# Patient Record
Sex: MALE | Race: ASIAN | NOT HISPANIC OR LATINO | URBAN - METROPOLITAN AREA
[De-identification: names, ages, dates, MRNs, and addresses within clinical notes are randomized per-mention and may not be internally consistent; named-entity substitution may affect disease eponyms.]

---

## 2023-11-05 ENCOUNTER — EMERGENCY (EMERGENCY)
Facility: HOSPITAL | Age: 48
LOS: 1 days | Discharge: HOME | End: 2023-11-05
Attending: EMERGENCY MEDICINE | Admitting: EMERGENCY MEDICINE
Payer: COMMERCIAL

## 2023-11-05 VITALS
HEIGHT: 70 IN | TEMPERATURE: 98 F | OXYGEN SATURATION: 99 % | DIASTOLIC BLOOD PRESSURE: 80 MMHG | WEIGHT: 160.06 LBS | HEART RATE: 65 BPM | SYSTOLIC BLOOD PRESSURE: 145 MMHG | RESPIRATION RATE: 16 BRPM

## 2023-11-05 PROCEDURE — 12002 RPR S/N/AX/GEN/TRNK2.6-7.5CM: CPT

## 2023-11-05 PROCEDURE — 99283 EMERGENCY DEPT VISIT LOW MDM: CPT

## 2023-11-05 PROCEDURE — 90715 TDAP VACCINE 7 YRS/> IM: CPT

## 2023-11-05 PROCEDURE — 99284 EMERGENCY DEPT VISIT MOD MDM: CPT | Mod: 25

## 2023-11-05 RX ORDER — TETANUS TOXOID, REDUCED DIPHTHERIA TOXOID AND ACELLULAR PERTUSSIS VACCINE, ADSORBED 5; 2.5; 8; 8; 2.5 [IU]/.5ML; [IU]/.5ML; UG/.5ML; UG/.5ML; UG/.5ML
0.5 SUSPENSION INTRAMUSCULAR ONCE
Refills: 0 | Status: COMPLETED | OUTPATIENT
Start: 2023-11-05 | End: 2023-11-05

## 2023-11-05 RX ADMIN — TETANUS TOXOID, REDUCED DIPHTHERIA TOXOID AND ACELLULAR PERTUSSIS VACCINE, ADSORBED 0.5 MILLILITER(S): 5; 2.5; 8; 8; 2.5 SUSPENSION INTRAMUSCULAR at 15:37

## 2023-11-05 NOTE — ED ADULT NURSE NOTE - NSFALLUNIVINTERV_ED_ALL_ED
Bed/Stretcher in lowest position, wheels locked, appropriate side rails in place/Call bell, personal items and telephone in reach/Instruct patient to call for assistance before getting out of bed/chair/stretcher/Non-slip footwear applied when patient is off stretcher/[place] to call system/Physically safe environment - no spills, clutter or unnecessary equipment/Purposeful proactive rounding/Room/bathroom lighting operational, light cord in reach

## 2023-11-05 NOTE — ED ADULT NURSE NOTE - OBJECTIVE STATEMENT
Pt came in for a laceration wound on his posterior right ankle. Pt reported a bench fell and scraped his ankle . Motor and sensory intact

## 2023-11-05 NOTE — ED ADULT NURSE NOTE - SUICIDE SCREENING QUESTION 1
Problem: PAIN - ADULT  Goal: Verbalizes/displays adequate comfort level or baseline comfort level  Description: Interventions:  - Encourage patient to monitor pain and request assistance  - Assess pain using appropriate pain scale  - Administer analgesics based on type and severity of pain and evaluate response  - Implement non-pharmacological measures as appropriate and evaluate response  - Consider cultural and social influences on pain and pain management  - Notify physician/advanced practitioner if interventions unsuccessful or patient reports new pain  Outcome: Progressing     Problem: INFECTION - ADULT  Goal: Absence or prevention of progression during hospitalization  Description: INTERVENTIONS:  - Assess and monitor for signs and symptoms of infection  - Monitor lab/diagnostic results  - Monitor all insertion sites, i e  indwelling lines, tubes, and drains  - Monitor endotracheal if appropriate and nasal secretions for changes in amount and color  - Agency appropriate cooling/warming therapies per order  - Administer medications as ordered  - Instruct and encourage patient and family to use good hand hygiene technique  - Identify and instruct in appropriate isolation precautions for identified infection/condition  Outcome: Progressing  Goal: Absence of fever/infection during neutropenic period  Description: INTERVENTIONS:  - Monitor WBC    Outcome: Progressing     Problem: SAFETY ADULT  Goal: Patient will remain free of falls  Description: INTERVENTIONS:  - Educate patient/family on patient safety including physical limitations  - Instruct patient to call for assistance with activity   - Consult OT/PT to assist with strengthening/mobility   - Keep Call bell within reach  - Keep bed low and locked with side rails adjusted as appropriate  - Keep care items and personal belongings within reach  - Initiate and maintain comfort rounds  - Make Fall Risk Sign visible to staff  - Offer Toileting every  Hours, in advance of need  - Initiate/Maintain alarm  - Obtain necessary fall risk management equipment:   - Apply yellow socks and bracelet for high fall risk patients  - Consider moving patient to room near nurses station  Outcome: Progressing  Goal: Maintain or return to baseline ADL function  Description: INTERVENTIONS:  -  Assess patient's ability to carry out ADLs; assess patient's baseline for ADL function and identify physical deficits which impact ability to perform ADLs (bathing, care of mouth/teeth, toileting, grooming, dressing, etc )  - Assess/evaluate cause of self-care deficits   - Assess range of motion  - Assess patient's mobility; develop plan if impaired  - Assess patient's need for assistive devices and provide as appropriate  - Encourage maximum independence but intervene and supervise when necessary  - Involve family in performance of ADLs  - Assess for home care needs following discharge   - Consider OT consult to assist with ADL evaluation and planning for discharge  - Provide patient education as appropriate  Outcome: Progressing  Goal: Maintains/Returns to pre admission functional level  Description: INTERVENTIONS:  - Perform BMAT or MOVE assessment daily    - Set and communicate daily mobility goal to care team and patient/family/caregiver  - Collaborate with rehabilitation services on mobility goals if consulted  - Perform Range of Motion  times a day  - Reposition patient every  hours    - Dangle patient  times a day  - Stand patient  times a day  - Ambulate patient  times a day  - Out of bed to chair  times a day   - Out of bed for meals  times a day  - Out of bed for toileting  - Record patient progress and toleration of activity level   Outcome: Progressing     Problem: DISCHARGE PLANNING  Goal: Discharge to home or other facility with appropriate resources  Description: INTERVENTIONS:  - Identify barriers to discharge w/patient and caregiver  - Arrange for needed discharge resources and transportation as appropriate  - Identify discharge learning needs (meds, wound care, etc )  - Arrange for interpretive services to assist at discharge as needed  - Refer to Case Management Department for coordinating discharge planning if the patient needs post-hospital services based on physician/advanced practitioner order or complex needs related to functional status, cognitive ability, or social support system  Outcome: Progressing     Problem: Knowledge Deficit  Goal: Patient/family/caregiver demonstrates understanding of disease process, treatment plan, medications, and discharge instructions  Description: Complete learning assessment and assess knowledge base    Interventions:  - Provide teaching at level of understanding  - Provide teaching via preferred learning methods  Outcome: Progressing     Problem: GASTROINTESTINAL - ADULT  Goal: Minimal or absence of nausea and/or vomiting  Description: INTERVENTIONS:  - Administer IV fluids if ordered to ensure adequate hydration  - Maintain NPO status until nausea and vomiting are resolved  - Nasogastric tube if ordered  - Administer ordered antiemetic medications as needed  - Provide nonpharmacologic comfort measures as appropriate  - Advance diet as tolerated, if ordered  - Consider nutrition services referral to assist patient with adequate nutrition and appropriate food choices  Outcome: Progressing  Goal: Maintains or returns to baseline bowel function  Description: INTERVENTIONS:  - Assess bowel function  - Encourage oral fluids to ensure adequate hydration  - Administer IV fluids if ordered to ensure adequate hydration  - Administer ordered medications as needed  - Encourage mobilization and activity  - Consider nutritional services referral to assist patient with adequate nutrition and appropriate food choices  Outcome: Progressing  Goal: Maintains adequate nutritional intake  Description: INTERVENTIONS:  - Monitor percentage of each meal consumed  - Identify factors contributing to decreased intake, treat as appropriate  - Assist with meals as needed  - Monitor I&O, weight, and lab values if indicated  - Obtain nutrition services referral as needed  Outcome: Progressing  Goal: Oral mucous membranes remain intact  Description: INTERVENTIONS  - Assess oral mucosa and hygiene practices  - Implement preventative oral hygiene regimen  - Implement oral medicated treatments as ordered  - Initiate Nutrition services referral as needed  Outcome: Progressing     Problem: Nutrition/Hydration-ADULT  Goal: Nutrient/Hydration intake appropriate for improving, restoring or maintaining nutritional needs  Description: Monitor and assess patient's nutrition/hydration status for malnutrition  Collaborate with interdisciplinary team and initiate plan and interventions as ordered  Monitor patient's weight and dietary intake as ordered or per policy  Utilize nutrition screening tool and intervene as necessary  Determine patient's food preferences and provide high-protein, high-caloric foods as appropriate       INTERVENTIONS:  - Monitor oral intake, urinary output, labs, and treatment plans  - Assess nutrition and hydration status and recommend course of action  - Evaluate amount of meals eaten  - Assist patient with eating if necessary   - Allow adequate time for meals  - Recommend/ encourage appropriate diets, oral nutritional supplements, and vitamin/mineral supplements  - Order, calculate, and assess calorie counts as needed  - Recommend, monitor, and adjust tube feedings and TPN/PPN based on assessed needs  - Assess need for intravenous fluids  - Provide specific nutrition/hydration education as appropriate  - Include patient/family/caregiver in decisions related to nutrition  Outcome: Progressing No

## 2023-11-05 NOTE — ED PROVIDER NOTE - OBJECTIVE STATEMENT
47-year-old male without reported past medical history presents today due to a laceration to the right ankle.  Patient reports that he sat on a bench which collapsed and cut his leg.  Patient is not up-to-date with tetanus.  Patient reports some discomfort to the wound.  Patient denies numbness, weakness, discharge, foreign body, or any other complaints.

## 2023-11-05 NOTE — ED PROVIDER NOTE - NSFOLLOWUPINSTRUCTIONS_ED_ALL_ED_FT
Sutures should be removed in 12-14 days. Return for any signs of infection. Limited walking on extremity, utilize crutches.       Laceration Care, Adult    A laceration is a cut that may go through all layers of the skin and into the tissue that is right under the skin. Some lacerations heal on their own. Others need to be closed with stitches (sutures), staples, skin adhesive strips, or skin glue.    Proper care of a laceration reduces the risk for infection, helps the laceration heal better, and may prevent scarring.    General tips  Keep the wound clean and dry.  Do not scratch or pick at the wound.  Wash your hands with soap and water for at least 20 seconds before and after touching your wound or changing your bandage (dressing). If soap and water are not available, use hand .  Do not usedisinfectants or antiseptics, such as rubbing alcohol, to clean your wound unless told by your health care provider.  If you were given a dressing, you should change it at least once a day, or as told by your health care provider. You should also change it if it becomes wet or dirty.  How to care for your laceration  If sutures or staples were used:    Keep the wound completely dry for the first 24 hours, or as told by your health care provider. After that time, you may shower or bathe. Do not soak your wound in water until after the sutures or staples have been removed.  Clean the wound once each day, or as told by your health care provider. To do this:  Wash the wound with soap and water.  Rinse the wound with water to remove all soap.  Pat the wound dry with a clean towel. Do not rub the wound.  After cleaning the wound, apply a thin layer of antibiotic ointment, other topical ointments, or a non-adherent dressing as told by your health care provider. This will help prevent infection and keep the dressing from sticking to the wound.  Have the sutures or staples removed as told by your health care provider. Do not remove sutures or staples yourself.  If skin adhesive strips were used:    Do not get the skin adhesive strips wet. You may shower or bathe, but keep the wound dry.  If the wound gets wet, pat it dry with a clean towel. Do not rub the wound.  Skin adhesive strips fall off on their own. If adhesive strip edges start to loosen and curl up, you may trim the loose edges. Do not remove adhesive strips completely unless your health care provider tells you to do that.  If skin glue was used:    You may shower or bathe, but try to keep the wound dry. Do not soak the wound in water.  After showering or bathing, pat the wound dry with a clean towel. Do not rub the wound.  Do not do any activities that will make you sweat a lot until the skin glue has fallen off.  Do not apply liquid, cream, or ointment medicine to the wound while the skin glue is in place. Doing this may loosen the film before the wound has healed.  If a dressing is placed over the wound, do not apply tape directly over the skin glue. Doing this may cause the glue to be pulled off before the wound has healed.  Do not pick at the glue. Skin glue usually remains in place for 5–10 days and then falls off the skin.  Follow these instructions at home:  Medicines    Take over-the-counter and prescription medicines only as told by your health care provider.  If you were prescribed an antibiotic medicine or ointment, take or apply it as told by your health care provider. Do not stop using it even if your condition improves.  Managing pain and swelling    If directed, put ice on the injured area. To do this:  Put ice in a plastic bag.  Place a towel between your skin and the bag.  Leave the ice on for 20 minutes, 2–3 times a day.  Remove the ice if your skin turns bright red. This is very important. If you cannot feel pain, heat, or cold, you have a greater risk of damage to the area.  Raise (elevate) the injured area above the level of your heart while you are sitting or lying down for the first 24–48 hours after the laceration is repaired.  General instructions    Two wounds closed with skin glue. One is normal. The other is red with pus and infected.  Avoid any activity that could cause your wound to reopen.  Check your wound every day for signs of infection. Watch for:  More redness, swelling, or pain.  Fluid or blood.  Warmth.  Pus or a bad smell.  Keep all follow-up visits. This is important.  Contact a health care provider if:  You received a tetanus shot and you have swelling, severe pain, redness, or bleeding at the injection site.  Your closed wound breaks open.  You have any of these signs of infection:  More redness, swelling, or pain around your wound.  Fluid or blood coming from your wound.  Warmth coming from your wound.  Pus or a bad smell coming from your wound.  A fever.  You notice something coming out of the wound, such as wood or glass.  Your pain is not controlled with medicine.  You notice a change in the color of your skin near your wound.  You need to change the dressing often.  You develop a new rash.  You have numbness around the wound.  Get help right away if:  You develop severe swelling around the wound.  Your pain suddenly increases and is severe.  You develop painful lumps near the wound or on skin anywhere else on your body.  You have a red streak going away from your wound.  The wound is on your hand or foot, and you cannot properly move a finger or toe.  The wound is on your hand or foot, and you notice that your fingers or toes look pale or bluish.  Summary  A laceration is a cut that may go through all layers of the skin and into the tissue that is right under the skin.  Some lacerations heal on their own. Others need to be closed with stitches (sutures), staples, skin adhesive strips, or skin glue.  Proper care of a laceration reduces the risk of infection, helps the laceration heal better, and may prevent scarring.  This information is not intended to replace advice given to you by your health care provider. Make sure you discuss any questions you have with your health care provider.

## 2023-11-05 NOTE — ED PROVIDER NOTE - CLINICAL SUMMARY MEDICAL DECISION MAKING FREE TEXT BOX
47-year-old male complaining of laceration to right posterior ankle after a bench he was sitting on collapsed today.  Denies other injury or symptom.  Physical exam vital signs stable afebrile no distress.  There is a 3 cm avulsion laceration right posterior ankle overlying the Achilles tendon.  No tendon deformity.  Full range of motion pulses sensation intact.  Remainder of exam unremarkable.  Impression is ankle laceration  Plan is suturing and wound care instructions.

## 2023-11-05 NOTE — ED PROVIDER NOTE - PATIENT PORTAL LINK FT
You can access the FollowMyHealth Patient Portal offered by [facility] by registering at the following website: [email or web address]. By joining [facility]’s FollowMyHealth portal, you will also be able to view your health information using other applications (apps) compatible with our system.

## 2023-11-05 NOTE — ED ADULT TRIAGE NOTE - CHIEF COMPLAINT QUOTE
" I have a deep wound on the back of my right ankle - bench fell and scraped my ankle " Pt up-to-date with tetanus vaccine

## 2023-11-05 NOTE — ED PROVIDER NOTE - PHYSICAL EXAMINATION
Constitutional: Awake, Alert, non-toxic. NAD. Well appearing, well nourished.   HEAD: Normocephalic, atraumatic.   EYES: EOM intact, conjunctiva and sclera are clear bilaterally.   ENT: No rhinorrhea, patent, mucous membranes pink/moist, no drooling or stridor.   NECK: Supple, non-tender  RESPIRATORY: Normal respiratory effort  EXTREMITIES: Full passive and active ROM in all extremities; (+) 3 cm right ankle posterior laceration, no obvious tendon involvement, no FB, ankle and foot non-tender to palpation; distal pulses palpable and symmetric  SKIN: Warm, dry; good skin turgor, no apparent lesions or rashes, no ecchymosis, brisk capillary refill.  NEURO: A&O x3. Sensory and motor functions are grossly intact. Speech is normal. Appearance and judgement seem appropriate for gender and age.